# Patient Record
Sex: MALE | Race: AMERICAN INDIAN OR ALASKA NATIVE
[De-identification: names, ages, dates, MRNs, and addresses within clinical notes are randomized per-mention and may not be internally consistent; named-entity substitution may affect disease eponyms.]

---

## 2017-02-07 ENCOUNTER — HOSPITAL ENCOUNTER (EMERGENCY)
Dept: HOSPITAL 5 - ED | Age: 22
Discharge: LEFT BEFORE DISCHARGE | End: 2017-02-07
Payer: MEDICAID

## 2017-02-07 VITALS — SYSTOLIC BLOOD PRESSURE: 112 MMHG | DIASTOLIC BLOOD PRESSURE: 73 MMHG

## 2017-02-07 DIAGNOSIS — Z53.21: ICD-10-CM

## 2017-02-07 DIAGNOSIS — L02.32: Primary | ICD-10-CM

## 2018-03-18 ENCOUNTER — HOSPITAL ENCOUNTER (EMERGENCY)
Dept: HOSPITAL 5 - ED | Age: 23
Discharge: HOME | End: 2018-03-18
Payer: COMMERCIAL

## 2018-03-18 VITALS — SYSTOLIC BLOOD PRESSURE: 113 MMHG | DIASTOLIC BLOOD PRESSURE: 83 MMHG
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
## 2018-03-18 DIAGNOSIS — F41.9: ICD-10-CM
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
## 2018-03-18 DIAGNOSIS — F32.9: ICD-10-CM
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
## 2018-03-18 DIAGNOSIS — S16.1XXA: Primary | ICD-10-CM

## 2018-03-18 PROCEDURE — 72125 CT NECK SPINE W/O DYE: CPT
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
